# Patient Record
Sex: MALE | Race: WHITE | NOT HISPANIC OR LATINO | URBAN - METROPOLITAN AREA
[De-identification: names, ages, dates, MRNs, and addresses within clinical notes are randomized per-mention and may not be internally consistent; named-entity substitution may affect disease eponyms.]

---

## 2019-11-07 ENCOUNTER — FOLLOW UP ESTABLISHED (OUTPATIENT)
Dept: URBAN - METROPOLITAN AREA CLINIC 85 | Facility: CLINIC | Age: 49
End: 2019-11-07
Payer: COMMERCIAL

## 2019-11-07 DIAGNOSIS — H01.024 SQUAMOUS BLEPHARITIS OF LEFT UPPER LID: ICD-10-CM

## 2019-11-07 DIAGNOSIS — H01.021 SQUAMOUS BLEPHARITIS OF RIGHT UPPER LID: Primary | ICD-10-CM

## 2019-11-07 DIAGNOSIS — H01.022 SQUAMOUS BLEPHARITIS OF RIGHT LOWER LID: ICD-10-CM

## 2019-11-07 PROCEDURE — 92014 COMPRE OPH EXAM EST PT 1/>: CPT | Performed by: OPHTHALMOLOGY

## 2019-11-07 ASSESSMENT — INTRAOCULAR PRESSURE: OD: 10

## 2019-11-07 ASSESSMENT — VISUAL ACUITY: OD: 20/40

## 2019-11-07 ASSESSMENT — KERATOMETRY: OD: 41.63

## 2019-11-22 ENCOUNTER — OFFICE VISIT (OUTPATIENT)
Dept: URBAN - METROPOLITAN AREA CLINIC 32 | Facility: CLINIC | Age: 49
End: 2019-11-22
Payer: COMMERCIAL

## 2019-11-22 DIAGNOSIS — Q11.1 ANOPHTHALMOS: Primary | ICD-10-CM

## 2019-11-22 PROCEDURE — 99203 OFFICE O/P NEW LOW 30 MIN: CPT | Performed by: OPTOMETRIST

## 2019-11-22 PROCEDURE — V2628 FABRICATION & FITTING: HCPCS | Performed by: OPTOMETRIST

## 2019-11-22 ASSESSMENT — INTRAOCULAR PRESSURE: OD: 13

## 2019-11-22 NOTE — IMPRESSION/PLAN
Impression: Anophthalmos: Q11.1. Plan: Patient needs a prosthetic eye due to the shrinking socket and atrophy of lids and surrounding tissue. Recommend a scleral shell with the possibility of socket and lid expansion with progressively larger prosthetic eyes.  disp conformer, refer for eval and cut of adhesion

## 2020-01-16 ENCOUNTER — OFFICE VISIT (OUTPATIENT)
Dept: URBAN - METROPOLITAN AREA CLINIC 32 | Facility: CLINIC | Age: 50
End: 2020-01-16
Payer: COMMERCIAL

## 2020-01-16 PROCEDURE — 92012 INTRM OPH EXAM EST PATIENT: CPT | Performed by: OPHTHALMOLOGY

## 2020-01-16 ASSESSMENT — INTRAOCULAR PRESSURE: OD: 12

## 2020-01-16 NOTE — IMPRESSION/PLAN
Impression: Anophthalmos: Q11.1. OS. Condition: established, stable. Plan: Discussed diagnosis in detail with patient. Discussed treatment options with patient. Inferior and superior fornix are both deep, there is no eyelid adhesions. Do not recommend any socket revision or eyelid surgery. Recommend RV to [facility] for new prosthesis fitting.

## 2020-01-29 ENCOUNTER — OFFICE VISIT (OUTPATIENT)
Dept: URBAN - METROPOLITAN AREA CLINIC 32 | Facility: CLINIC | Age: 50
End: 2020-01-29
Payer: COMMERCIAL

## 2020-01-29 PROCEDURE — 99213 OFFICE O/P EST LOW 20 MIN: CPT | Performed by: OPTOMETRIST

## 2020-01-29 PROCEDURE — V2627 SCLERAL COVER SHELL: HCPCS | Performed by: OPTOMETRIST

## 2020-01-29 ASSESSMENT — INTRAOCULAR PRESSURE: OD: 11

## 2020-01-29 NOTE — IMPRESSION/PLAN
Impression: Anophthalmos: Q11.1. Plan: Patient needs a prosthetic eye due to the shrinking socket and atrophy of lids and surrounding tissue. Recommend a scleral shell with the possibility of socket and lid expansion with progressively larger prosthetic eyes.  disp shell today

## 2020-06-04 ENCOUNTER — OFFICE VISIT (OUTPATIENT)
Dept: URBAN - METROPOLITAN AREA CLINIC 32 | Facility: CLINIC | Age: 50
End: 2020-06-04
Payer: COMMERCIAL

## 2020-06-04 DIAGNOSIS — H01.025 SQUAMOUS BLEPHARITIS OF LEFT LOWER LID: ICD-10-CM

## 2020-06-04 PROCEDURE — 99213 OFFICE O/P EST LOW 20 MIN: CPT | Performed by: OPTOMETRIST

## 2020-06-04 PROCEDURE — V2624 POLISHING ARTIFICAL EYE: HCPCS | Performed by: OPTOMETRIST

## 2020-06-04 RX ORDER — PREDNISOLONE ACETATE 10 MG/ML
1 % SUSPENSION/ DROPS OPHTHALMIC
Qty: 15 | Refills: 4 | Status: INACTIVE
Start: 2020-06-04 | End: 2021-02-11

## 2020-06-04 RX ORDER — CIPROFLOXACIN HYDROCHLORIDE 3.5 MG/ML
0.3 % SOLUTION/ DROPS TOPICAL
Qty: 5 | Refills: 4 | Status: INACTIVE
Start: 2020-06-04 | End: 2021-02-11

## 2020-06-04 RX ORDER — LOTEPREDNOL ETABONATE AND TOBRAMYCIN 5; 3 MG/ML; MG/ML
SUSPENSION/ DROPS OPHTHALMIC
Qty: 5 | Refills: 6 | Status: INACTIVE
Start: 2020-06-04 | End: 2020-06-04

## 2020-06-04 ASSESSMENT — INTRAOCULAR PRESSURE: OD: 11

## 2020-06-04 NOTE — IMPRESSION/PLAN
Impression: Squamous blepharitis of left lower lid Plan: Monitor. Patient education regarding findings.   Recommend Tears OU QID

## 2021-02-11 ENCOUNTER — FOLLOW UP ESTABLISHED (OUTPATIENT)
Dept: URBAN - METROPOLITAN AREA CLINIC 85 | Facility: CLINIC | Age: 51
End: 2021-02-11
Payer: COMMERCIAL

## 2021-02-11 DIAGNOSIS — H53.19 OTHER SUBJECTIVE VISUAL DISTURBANCES: ICD-10-CM

## 2021-02-11 PROCEDURE — 92134 CPTRZ OPH DX IMG PST SGM RTA: CPT | Performed by: OPHTHALMOLOGY

## 2021-02-11 PROCEDURE — 92014 COMPRE OPH EXAM EST PT 1/>: CPT | Performed by: OPHTHALMOLOGY

## 2021-02-11 ASSESSMENT — VISUAL ACUITY: OD: 20/50

## 2021-02-11 ASSESSMENT — KERATOMETRY: OD: 41.50

## 2021-02-11 ASSESSMENT — INTRAOCULAR PRESSURE: OD: 12

## 2022-03-10 ENCOUNTER — OFFICE VISIT (OUTPATIENT)
Dept: URBAN - METROPOLITAN AREA CLINIC 85 | Facility: CLINIC | Age: 52
End: 2022-03-10
Payer: COMMERCIAL

## 2022-03-10 DIAGNOSIS — H52.4 PRESBYOPIA: ICD-10-CM

## 2022-03-10 PROCEDURE — 92014 COMPRE OPH EXAM EST PT 1/>: CPT | Performed by: OPHTHALMOLOGY

## 2022-03-10 ASSESSMENT — VISUAL ACUITY: OD: 20/25

## 2022-03-10 ASSESSMENT — INTRAOCULAR PRESSURE: OD: 12

## 2022-03-10 ASSESSMENT — KERATOMETRY: OD: 41.38

## 2022-03-10 NOTE — IMPRESSION/PLAN
Impression: Other anophthalmos: Q11.1. Plan: Monitor. RTC 1 year or ASAP if decrease in [address], pain or worsening of any condition.